# Patient Record
Sex: MALE | Race: BLACK OR AFRICAN AMERICAN
[De-identification: names, ages, dates, MRNs, and addresses within clinical notes are randomized per-mention and may not be internally consistent; named-entity substitution may affect disease eponyms.]

---

## 2018-04-05 ENCOUNTER — HOSPITAL ENCOUNTER (EMERGENCY)
Dept: HOSPITAL 72 - EMR | Age: 78
Discharge: NURSING, REHAB OR LONG-TERM CARE | End: 2018-04-05
Payer: MEDICARE

## 2018-04-05 VITALS — SYSTOLIC BLOOD PRESSURE: 132 MMHG | DIASTOLIC BLOOD PRESSURE: 63 MMHG

## 2018-04-05 VITALS — SYSTOLIC BLOOD PRESSURE: 144 MMHG | DIASTOLIC BLOOD PRESSURE: 99 MMHG

## 2018-04-05 VITALS — DIASTOLIC BLOOD PRESSURE: 99 MMHG | SYSTOLIC BLOOD PRESSURE: 144 MMHG

## 2018-04-05 VITALS — BODY MASS INDEX: 30.48 KG/M2 | WEIGHT: 230 LBS | HEIGHT: 73 IN

## 2018-04-05 VITALS — SYSTOLIC BLOOD PRESSURE: 139 MMHG | DIASTOLIC BLOOD PRESSURE: 60 MMHG

## 2018-04-05 VITALS — DIASTOLIC BLOOD PRESSURE: 69 MMHG | SYSTOLIC BLOOD PRESSURE: 146 MMHG

## 2018-04-05 DIAGNOSIS — I50.9: ICD-10-CM

## 2018-04-05 DIAGNOSIS — R22.1: Primary | ICD-10-CM

## 2018-04-05 DIAGNOSIS — I11.0: ICD-10-CM

## 2018-04-05 DIAGNOSIS — R59.0: ICD-10-CM

## 2018-04-05 DIAGNOSIS — K21.9: ICD-10-CM

## 2018-04-05 DIAGNOSIS — J44.9: ICD-10-CM

## 2018-04-05 LAB
ADD MANUAL DIFF: NO
ALBUMIN SERPL-MCNC: 3.2 G/DL (ref 3.4–5)
ALBUMIN/GLOB SERPL: 0.7 {RATIO} (ref 1–2.7)
ALP SERPL-CCNC: 142 U/L (ref 46–116)
ALT SERPL-CCNC: 94 U/L (ref 12–78)
ANION GAP SERPL CALC-SCNC: 5 MMOL/L (ref 5–15)
AST SERPL-CCNC: 77 U/L (ref 15–37)
BASOPHILS NFR BLD AUTO: 1.6 % (ref 0–2)
BILIRUB SERPL-MCNC: 0.5 MG/DL (ref 0.2–1)
BUN SERPL-MCNC: 16 MG/DL (ref 7–18)
CALCIUM SERPL-MCNC: 8.6 MG/DL (ref 8.5–10.1)
CHLORIDE SERPL-SCNC: 105 MMOL/L (ref 98–107)
CK MB SERPL-MCNC: 0.8 NG/ML (ref 0–3.6)
CK SERPL-CCNC: 93 U/L (ref 26–308)
CO2 SERPL-SCNC: 30 MMOL/L (ref 21–32)
CREAT SERPL-MCNC: 1.2 MG/DL (ref 0.55–1.3)
EOSINOPHIL NFR BLD AUTO: 5.4 % (ref 0–3)
ERYTHROCYTE [DISTWIDTH] IN BLOOD BY AUTOMATED COUNT: 14.9 % (ref 11.6–14.8)
GLOBULIN SER-MCNC: 4.9 G/DL
HCT VFR BLD CALC: 51.3 % (ref 42–52)
HGB BLD-MCNC: 15.8 G/DL (ref 14.2–18)
LYMPHOCYTES NFR BLD AUTO: 25.6 % (ref 20–45)
MCV RBC AUTO: 89 FL (ref 80–99)
MONOCYTES NFR BLD AUTO: 5.7 % (ref 1–10)
NEUTROPHILS NFR BLD AUTO: 61.8 % (ref 45–75)
PLATELET # BLD: 152 K/UL (ref 150–450)
POTASSIUM SERPL-SCNC: 3.7 MMOL/L (ref 3.5–5.1)
RBC # BLD AUTO: 5.75 M/UL (ref 4.7–6.1)
SODIUM SERPL-SCNC: 140 MMOL/L (ref 136–145)
WBC # BLD AUTO: 5.2 K/UL (ref 4.8–10.8)

## 2018-04-05 PROCEDURE — 87040 BLOOD CULTURE FOR BACTERIA: CPT

## 2018-04-05 PROCEDURE — 93005 ELECTROCARDIOGRAM TRACING: CPT

## 2018-04-05 PROCEDURE — 82550 ASSAY OF CK (CPK): CPT

## 2018-04-05 PROCEDURE — 83605 ASSAY OF LACTIC ACID: CPT

## 2018-04-05 PROCEDURE — 82553 CREATINE MB FRACTION: CPT

## 2018-04-05 PROCEDURE — 71045 X-RAY EXAM CHEST 1 VIEW: CPT

## 2018-04-05 PROCEDURE — 70491 CT SOFT TISSUE NECK W/DYE: CPT

## 2018-04-05 PROCEDURE — 80053 COMPREHEN METABOLIC PANEL: CPT

## 2018-04-05 PROCEDURE — 36415 COLL VENOUS BLD VENIPUNCTURE: CPT

## 2018-04-05 PROCEDURE — 99284 EMERGENCY DEPT VISIT MOD MDM: CPT

## 2018-04-05 PROCEDURE — 83690 ASSAY OF LIPASE: CPT

## 2018-04-05 PROCEDURE — 85025 COMPLETE CBC W/AUTO DIFF WBC: CPT

## 2018-04-05 NOTE — EMERGENCY ROOM REPORT
History of Present Illness


General


Chief Complaint:  General Complaint


Source:  Patient, Medical Record





Present Illness


HPI


Patient presents with mass to the right side of the neck


Patient himself reports that this was visualized by staff members today


He denies any fevers or chills denies any chest pain or shortness of breath


He is a dentulous denies any difficulty opening or closing the mouth





The area of the neck is tender to palpation otherwise does not have significant 

discomfort denies any headache denies any posterior neck pain


The area in question is localized to the right lateral aspect


Allergies:  


Coded Allergies:  


     No Known Allergies (Verified , 12/9/06)





Patient History


Past Medical History:  see triage record


Pertinent Family History:  none


Reviewed Nursing Documentation:  PMH: Agreed; PSxH: Agreed





Nursing Documentation-PMH


Past Medical History:  No History, Except For


Hx Cardiac Problems:  Yes - CHF


Hx Hypertension:  Yes


Hx Pacemaker:  No - PVD, CHRONIC AIRWAY OBSTRUCTION,ULCERS


Hx Asthma:  Yes


Hx COPD:  Yes


Hx Cancer:  No


Hx Gastrointestinal Problems:  Yes - gerd


Hx Neurological Problems:  No





Review of Systems


All Other Systems:  negative except mentioned in HPI





Physical Exam





Vital Signs








  Date Time  Temp Pulse Resp B/P (MAP) Pulse Ox O2 Delivery O2 Flow Rate FiO2


 


4/5/18 13:59 97.9 78 18 153/74 94 Room Air  





 97.9       








Sp02 EP Interpretation:  reviewed, normal


General Appearance:  well appearing, no apparent distress


Head:  normocephalic, atraumatic


Eyes:  bilateral eye PERRL, bilateral eye EOMI


ENT:  hearing grossly normal, normal pharynx, TMs + canals normal, uvula midline


Neck:  full range of motion, supple, no meningismus, no bony tend, other - 

Patient has palpable fluctuance to the right SCM region, area also appears to 

involve the lower parotid region and the angle of the mandible, no signs of 

trismus


Respiratory:  lungs clear, normal breath sounds, no rhonchi, no respiratory 

distress, no retraction, no accessory muscle use


Cardiovascular #1:  normal peripheral pulses, regular rate, rhythm, no edema, 

no gallop, no JVD, no murmur


Gastrointestinal:  normal bowel sounds, non tender, soft, no mass, no 

organomegaly, non-distended, no guarding, no hernia, no pulsatile mass, no 

rebound


Genitourinary:  no CVA tenderness


Musculoskeletal:  normal inspection


Neurologic:  oriented x3, responsive, CNs III-XII nml as tested, motor strength/

tone normal, sensory intact


Psychiatric:  mood/affect normal


Skin:  other - The swelling to the right lateral neck, is more noticeable when 

palpated, however there is some mild fullness appreciated as well subjectively


Lymphatic:  other - The area in question does not palpate in a circumscribed 

fashion, does not appear to be a palpable node





Medical Decision Making


Diagnostic Impression:  


 Primary Impression:  


 Neck mass


ER Course


Given the patient's history and presentation extensive blood work was initiated 

patient is complex also requiring CT imaging of the neck





CT imaging does see evidence of 4.4 cm solid mass involving the right 

sternocleidomastoid





Patient's blood work otherwise at baseline levels


In discussion with the patient's primary physician requesting discussion with 

general surgery


I did make contact with consultation the patient will be followed as an 

outpatient basis with further biopsy and testing





Labs








Test


  4/5/18


14:30


 


White Blood Count


  5.2 K/UL


(4.8-10.8)


 


Red Blood Count


  5.75 M/UL


(4.70-6.10)


 


Hemoglobin


  15.8 G/DL


(14.2-18.0)


 


Hematocrit


  51.3 %


(42.0-52.0)


 


Mean Corpuscular Volume 89 FL (80-99) 


 


Mean Corpuscular Hemoglobin


  27.5 PG


(27.0-31.0)


 


Mean Corpuscular Hemoglobin


Concent 30.8 G/DL


(32.0-36.0)


 


Red Cell Distribution Width


  14.9 %


(11.6-14.8)


 


Platelet Count


  152 K/UL


(150-450)


 


Mean Platelet Volume


  9.2 FL


(6.5-10.1)


 


Neutrophils (%) (Auto)


  61.8 %


(45.0-75.0)


 


Lymphocytes (%) (Auto)


  25.6 %


(20.0-45.0)


 


Monocytes (%) (Auto)


  5.7 %


(1.0-10.0)


 


Eosinophils (%) (Auto)


  5.4 %


(0.0-3.0)


 


Basophils (%) (Auto)


  1.6 %


(0.0-2.0)


 


Sodium Level


  140 MMOL/L


(136-145)


 


Potassium Level


  3.7 MMOL/L


(3.5-5.1)


 


Chloride Level


  105 MMOL/L


()


 


Carbon Dioxide Level


  30 MMOL/L


(21-32)


 


Anion Gap


  5 mmol/L


(5-15)


 


Blood Urea Nitrogen


  16 mg/dL


(7-18)


 


Creatinine


  1.2 MG/DL


(0.55-1.30)


 


Estimat Glomerular Filtration


Rate  mL/min (>60) 


 


 


Glucose Level


  104 MG/DL


()


 


Lactic Acid Level


  1.10 mmol/L


(0.66-2.22)


 


Calcium Level


  8.6 MG/DL


(8.5-10.1)


 


Total Bilirubin


  0.5 MG/DL


(0.2-1.0)


 


Aspartate Amino Transf


(AST/SGOT) 77 U/L (15-37) 


 


 


Alanine Aminotransferase


(ALT/SGPT) 94 U/L (12-78) 


 


 


Alkaline Phosphatase


  142 U/L


()


 


Total Creatine Kinase


  93 U/L


()


 


Creatine Kinase MB


  0.8 NG/ML


(0.0-3.6)


 


Creatine Kinase MB Relative


Index 0.8 


 


 


Total Protein


  8.1 G/DL


(6.4-8.2)


 


Albumin


  3.2 G/DL


(3.4-5.0)


 


Globulin 4.9 g/dL 


 


Albumin/Globulin Ratio 0.7 (1.0-2.7) 


 


Lipase


  56 U/L


()








CT/MRI/US Diagnostic Results


CT/MRI/US Diagnostic Results :  


   Impression


CT neckIMPRESSION: 


Right-sided neck mass as detailed above highly concerning for malignancy. Cancer


workup and surgical/ENT referral/evaluation recommended.


 


Mixing artifact versus thrombus in the right internal jugular vein adjacent to 

the


mass.


 


Cervical lymphadenopathy.


 


Aberrant right subclavian artery.


 


Emphysema with hypoventilatory changes versus pneumonitis and/or edema partially


visualized in the lung apices.





Last Vital Signs








  Date Time  Temp Pulse Resp B/P (MAP) Pulse Ox O2 Delivery O2 Flow Rate FiO2


 


4/5/18 13:59 97.9 78 18 153/74 94 Room Air  





 97.9       








Status:  improved


Disposition:  XFER SNF


Condition:  Stable





Additional Instructions:  


Urgent follow-up outpatient by general surgery











Vineet Garces DO Apr 5, 2018 14:21

## 2018-04-05 NOTE — DIAGNOSTIC IMAGING REPORT
Indication: Neck mass

 

Technique: CT of the soft tissues of the neck utilizing automated exposure control

with intravenous contrast. Venous scanning performed. Sagittal and coronal reformats.

 

CT dose: Total .02 mGycm; CTDI vol 19.51 mGy

 

Comparison: None

 

Findings: 

There is a solid mass lesion measuring approximately 4.4 cm located between the right

sternocleidomastoid muscle and the right carotid space at level of the hyoid bone. A

distinct fat plane between the mass and the sternocleidomastoid muscle is not seen

and infiltration/extension to the right sternocleidomastoid muscle is not excluded.

There is effacement of fat planes by the mass between the right carotid space, right

mucosal pharyngeal space and right submandibular space. There is asymmetric

thickening/enlargement of the right right mucosal pharyngeal space/hypopharynx with

effacement of the right piriform sinus.

 

There is obliteration of the lower portions of the right internal jugular vein with

mixing artifact or thrombus in the more superior portions of the right internal

jugular vein (series 5 image #26). Tumor invasion into the internal jugular vein on

the right is not excluded. 

 

 There is right-sided cervical lymphadenopathy, with a right-sided jugular chain

lymph node measuring up to 11 mm in short axis. Small lymph nodes are also noted on

the left. The airway is patent.

 

Thoracic aorta appears normal in caliber with mild atherosclerotic calcification.

There is an aberrant right subclavian artery which is mild to moderately calcified.

Carotid arteries and vertebral arteries appear patent. Imaged intracranial structures

grossly unremarkable. Patient is edentulous. There are multilevel degenerative

changes of the cervical spine. There is pulmonary emphysema with hypoventilatory

changes or pneumonitis in the visualized upper lungs. 

 

IMPRESSION: 

Right-sided neck mass as detailed above highly concerning for malignancy. Cancer

workup and surgical/ENT referral/evaluation recommended.

 

Mixing artifact versus thrombus in the right internal jugular vein adjacent to the

mass.

 

Cervical lymphadenopathy.

 

Aberrant right subclavian artery.

 

Emphysema with hypoventilatory changes versus pneumonitis and/or edema partially

visualized in the lung apices.

 

The CT scanner at [facility] is accredited by the American College of

Radiology and the scans are performed using protocols designed to limit radiation

exposure to as low as reasonably achievable to attain images of sufficient resolution

adequate for diagnostic evaluation.

## 2018-04-05 NOTE — DIAGNOSTIC IMAGING REPORT
Indication: Dyspnea

 

Technique: XRAY Chest 1v

 

Comparison: 8/30/2012

 

Findings: Heart size is within normal limits. Atherosclerotic calcifications noted in

the aortic arch. There is interstitial opacification and patchy bilateral airspace

opacities. Question trace left pleural effusion. No pneumothorax. No acute osseous

abnormality seen.

 

Impression: Interstitial and hazy airspace opacities suggesting a degree of fluid

overload/CHF. Pneumonia is not entirely excluded. Clinical correlation and follow-up

exam recommended.

## 2018-04-24 ENCOUNTER — HOSPITAL ENCOUNTER (OUTPATIENT)
Dept: HOSPITAL 72 - SUR | Age: 78
Discharge: HOME | End: 2018-04-24
Payer: MEDICARE

## 2018-04-24 VITALS — SYSTOLIC BLOOD PRESSURE: 137 MMHG | DIASTOLIC BLOOD PRESSURE: 76 MMHG

## 2018-04-24 VITALS — HEIGHT: 68 IN | WEIGHT: 220 LBS | BODY MASS INDEX: 33.34 KG/M2

## 2018-04-24 VITALS — SYSTOLIC BLOOD PRESSURE: 126 MMHG | DIASTOLIC BLOOD PRESSURE: 70 MMHG

## 2018-04-24 VITALS — SYSTOLIC BLOOD PRESSURE: 126 MMHG | DIASTOLIC BLOOD PRESSURE: 69 MMHG

## 2018-04-24 VITALS — SYSTOLIC BLOOD PRESSURE: 135 MMHG | DIASTOLIC BLOOD PRESSURE: 69 MMHG

## 2018-04-24 VITALS — SYSTOLIC BLOOD PRESSURE: 148 MMHG | DIASTOLIC BLOOD PRESSURE: 68 MMHG

## 2018-04-24 VITALS — SYSTOLIC BLOOD PRESSURE: 143 MMHG | DIASTOLIC BLOOD PRESSURE: 71 MMHG

## 2018-04-24 VITALS — DIASTOLIC BLOOD PRESSURE: 77 MMHG | SYSTOLIC BLOOD PRESSURE: 156 MMHG

## 2018-04-24 VITALS — SYSTOLIC BLOOD PRESSURE: 144 MMHG | DIASTOLIC BLOOD PRESSURE: 71 MMHG

## 2018-04-24 VITALS — SYSTOLIC BLOOD PRESSURE: 141 MMHG | DIASTOLIC BLOOD PRESSURE: 71 MMHG

## 2018-04-24 VITALS — SYSTOLIC BLOOD PRESSURE: 129 MMHG | DIASTOLIC BLOOD PRESSURE: 63 MMHG

## 2018-04-24 DIAGNOSIS — C76.0: Primary | ICD-10-CM

## 2018-04-24 DIAGNOSIS — K21.9: ICD-10-CM

## 2018-04-24 DIAGNOSIS — E66.9: ICD-10-CM

## 2018-04-24 DIAGNOSIS — J44.9: ICD-10-CM

## 2018-04-24 DIAGNOSIS — M19.90: ICD-10-CM

## 2018-04-24 DIAGNOSIS — E11.9: ICD-10-CM

## 2018-04-24 DIAGNOSIS — I11.9: ICD-10-CM

## 2018-04-24 LAB
APTT BLD: 31 SEC (ref 23–33)
INR PPP: 1 (ref 0.9–1.1)

## 2018-04-24 PROCEDURE — 36415 COLL VENOUS BLD VENIPUNCTURE: CPT

## 2018-04-24 PROCEDURE — 21550 BIOPSY OF NECK/CHEST: CPT

## 2018-04-24 PROCEDURE — 85730 THROMBOPLASTIN TIME PARTIAL: CPT

## 2018-04-24 PROCEDURE — 85610 PROTHROMBIN TIME: CPT

## 2018-04-24 PROCEDURE — 94003 VENT MGMT INPAT SUBQ DAY: CPT

## 2018-04-24 PROCEDURE — 94150 VITAL CAPACITY TEST: CPT

## 2018-04-24 NOTE — ANETHESIA PREOPERATIVE EVAL
Anesthesia Pre-op PMH/ROS


General


Date of Evaluation:  Apr 24, 2018


Time of Evaluation:  10:40


Anesthesiologist:  Gail


ASA Score:  ASA 3


Mallampati Score


Class I : Soft palate, uvula, fauces, pillars visible


Class II: Soft palate, uvula, fauces visible


Class III: Soft palate, base of uvula visible


Class IV: Only hard plate visible


Mallampati Classification:  Class III


Surgeon:  Tamir


Diagnosis:  R neck mass


Surgical Procedure:  R neck Bx


Anesthesia History:  none


Family History:  no anesthesia problems


Allergies:  


Coded Allergies:  


     No Known Allergies (Verified , 12/9/06)





Past Medical History


Cardiovascular:  Reports: HTN, CAD; 


   Denies: MI, valve dz, arrhythmia


Pulmonary:  Reports: COPD; 


   Denies: asthma, NATHALY, other


Gastrointestinal/Genitourinary:  Reports: GERD, CRI; 


   Denies: ESRD, other


Neurologic/Psychiatric:  Reports: depression/anxiety; 


   Denies: dementia, CVA, TIA, other


Endocrine:  Reports: DM; 


   Denies: hypothyroidism, steroids, other


HEENT:  Denies: cataract (L), cataract (R), glaucoma, [place] (L), [place] (R), other


Hematology/Immune:  Reports: bleeding disorder - anticoagulated; 


   Denies: anemia, DVT, other


Musculoskeletal/Integumentary:  Reports: DJD; 


   Denies: OA, RA, DDD, edema, other


Other:  obesity


PMH Narrative:


as above


PSxH Narrative:


R foot transmetatarsal amputation





Anesthesia Pre-op Phys. Exam


Physician Exam





Last Vital Signs








  Date Time  Temp Pulse Resp B/P (MAP) Pulse Ox O2 Delivery O2 Flow Rate FiO2


 


4/24/18 08:25 97.7 72 18 141/71 97 Room Air  





 97.7       








Constitutional:  NAD


Neurologic:  CN 2-12 intact


Cardiovascular:  RRR, no M/R/G


Respiratory:  CTA


Gastrointestinal:  other - obesity





Airway Exam


Mallampati Score:  Class III


MO:  limited


Neck:  stiff


Teeth:  missing


Dentures:  no upper, no lower





Anesthesia Pre-op A/P


Labs





Coagulation








Test


  4/24/18


09:00


 


Prothrombin Time


  10.0 SEC


(9.30-11.50)


 


Prothromb Time International


Ratio 1.0 (0.9-1.1)  


 


 


Activated Partial


Thromboplast Time 31 SEC (23-33)


 











Risk Assessment & Plan


Assessment:


ASA 3


Plan:


GA with LMA


Status Change Before Surgery:  No





Pre-Antibiotics


Drug:  Ancef 1 gr.


Given Within 1 Hr of Incision:  Yes


Time Given:  11:06











REILLY MAGANA M.D. Apr 24, 2018 11:06

## 2018-04-24 NOTE — OPERATIVE NOTE - DICTATED
DATE OF OPERATION:  04/24/2018



PREOPERATIVE DIAGNOSIS:  Right neck mass.



POSTOPERATIVE DIAGNOSIS:  Right neck mass.



OPERATION PERFORMED:  Biopsy of right neck mass.



ATTENDING SURGEON:  Christopher Thrasher M.D.



ASSISTANT:  None.



ANESTHESIOLOGIST:  John Reynolds M.D.



ANESTHESIA:  General GTA plus local.



SPECIMENS:  Multiple 18-gauge core biopsies of right neck mass.



COMPLICATIONS:  None.



CONDITION:  Stable.



FLUIDS:  Please see anesthesia records.



ESTIMATED BLOOD LOSS:  Minimal.



DRAINS:  None.



IMPLANTS:  None.



WOUND CLASSIFICATION:  Class 1.



ANTIBIOTICS:  The patient was given 2 g IV Ancef 1 hour prior to cut

time.



COUNTS:  Sponge and needle count correct x2.



INDICATIONS FOR PROCEDURE:  This is an 78-year-old male, nursing home

resident, who few weeks ago was noted to have a worsening tender growing

right neck mass.  The patient was brought to the emergency department for

evaluation and had a CT scan, which identified a large compressing right

neck mass with thrombosed internal jugular, but without any tracheal

deviation.  The patient was deemed stable at that time and discharged and

came to follow up with me.  I was able to see the patient in the nursing

facility that he resides and evaluated and noted a large tender right neck

mass that was more fixed than mobile.  Given these findings of biopsy,

there was a high suspicion for particularly a malignant process given the

patient's long smoking tobacco history.  Given this, biopsy was indicated and

recommended.  Given the location of the mass and it is detailed as above

and noted on the CT, recommendation was made to have a biopsy done in the

operating room under close observation.  Risks, benefits, and alternatives

were discussed with the patient and his daughter at bedside, who consented

to surgery.  The biopsy was indicated and recommended to be done as soon

as possible for tissue diagnosis.



OPERATIVE NOTE:  The patient was taken to the operating room and placed on

the operating table in supine position with bilateral arms out.  All bony

prominences were well padded.  Preoperative time-out was taken in

identifying the patient, operative procedure, operating room surgery,

procedure, and surgical staff.  Side was marked preoperatively.

SCDs were placed.  General anesthesia was induced and the patient was

intubated.  The right neck was prepped and draped in the standard surgical

fashion.  The mass was easily palpable and identified.  A 1 to 2 cm

incision was made overlying the apex of the mass and carried down through

the platysma and muscles to the mass, which was noted to be hard, firm,

and with a whitish discoloration and hypervascular.  Multiple 18-gauge

Jake-Cut core biopsies were taken of the mass and sent to pathology for

review.  Hemostasis was achieved with electrocautery and pressure as

necessary.  Upon completion, local anesthetic was infiltrated throughout

the operative site.  At this time, the wound was closed in a two-layer

fashion beginning with the platysmal layer using a 3-0 Vicryl suture

followed by skin using 4-0 Monocryl subcuticular suture.  The patient

tolerated the procedure well.  Dressings were applied.  The patient was

sent to the PACU in stable condition.









  ______________________________________________

  Christopher Thrasher M.D.





DR:  BROOKS

D:  04/24/2018 15:28

T:  04/24/2018 19:52

JOB#:  [id number]

CC:



MATA

## 2018-04-24 NOTE — PRE-PROCEDURE NOTE/ATTESTATION
Pre-Procedure Note/Attestation


Complete Prior to Procedure


Planned Procedure:  right


Procedure Narrative:


biopsy of right neck mass





Indications for Procedure


Pre-Operative Diagnosis:


right neck mass





Attestation


I attest that I discussed the nature of the procedure; its benefits; risks and 

complications; and alternatives (and the risks and benefits of such alternatives

), prior to the procedure, with the patient (or the patient's legal 

representative).





I attest that, if there was a reasonable possibility of needing a blood 

transfusion, the patient (or the patient's legal representative) was given the 

[facility] standardized written summary, pursuant 

to the [place] Blood Safety Act ([place] Health and Safety Code # 1645, as 

amended).





I attest that I re-evaluated the patient just prior to the surgery and that 

there has been no change in the patient's H&P, except as documented below:











Christopher Thrasher Apr 24, 2018 11:27

## 2018-04-24 NOTE — IMMEDIATE POST-OP EVALUATION
Immediate Post-Op Evalulation


Immediate Post-Op Evalulation


Procedure:  R neck excisional Bx


Date of Evaluation:  Apr 24, 2018


Time of Evaluation:  11:42


IV Fluids:  800


Blood Products:  none


Estimated Blood Loss:  50


Urinary Output:  none


Blood Pressure Systolic:  128


Blood Pressure Diastolic:  70


Pulse Rate:  85


Respiratory Rate:  20


O2 Sat by Pulse Oximetry:  99


Temperature (Fahrenheit):  97.6


Pain Score (1-10):  1


Nausea:  No


Vomiting:  No


Complications


none


Patient Status:  reacts, patent, none


Hydration Status:  adequate











REILLY MAGANA M.D. Apr 24, 2018 11:44

## 2018-04-24 NOTE — BRIEF OPERATIVE NOTE
Immediate Post Operative Note


Operative Note


Pre-op Diagnosis:


right neck mass


Procedure:


biopsy of right neck mass


Post-op Diagnosis:  same as pre-op


Surgeon:  isi


Anesthesiologist:  wes


Anesthesia:  general, local


Specimen:  yes


Complications:  none


Condition:  stable


Fluids:  see records


Estimated Blood Loss:  minimal


Drains:  none


Implant(s) used?:  No











Christopher Thrasher Apr 24, 2018 11:27

## 2018-06-07 ENCOUNTER — HOSPITAL ENCOUNTER (OUTPATIENT)
Dept: HOSPITAL 72 - PAN | Age: 78
Discharge: HOME | End: 2018-06-07
Payer: MEDICARE

## 2018-06-07 DIAGNOSIS — Z91.81: ICD-10-CM

## 2018-06-07 DIAGNOSIS — I10: ICD-10-CM

## 2018-06-07 DIAGNOSIS — R22.1: Primary | ICD-10-CM

## 2018-06-07 DIAGNOSIS — J44.9: ICD-10-CM

## 2018-06-07 DIAGNOSIS — K21.9: ICD-10-CM

## 2018-06-07 DIAGNOSIS — I50.9: ICD-10-CM

## 2018-06-07 PROCEDURE — 99202 OFFICE O/P NEW SF 15 MIN: CPT

## 2018-06-07 NOTE — GI INITIAL CONSULT NOTE
History of Present Illness


General


Date patient seen:  Jun 7, 2018


Time patient seen:  15:00


Referring physician:  ESSIE


Reason for Consultation:  PEG





Present Illness


HPI


HISTORY OF PRESENT ILLNESS:  This is a 78-year-old  male with 

history of peripheral vascular disease and heavy smoker in the 


past who presents for PEG placement.  The patient is a nursing home resident, 

who few months ago was noted to have a worsening tender growing


right neck mass.  The patient was brought to the emergency department for 

evaluation and had a CT scan, which identified a large compressing right


neck mass with thrombosed internal jugular, but without any tracheal deviation.

  The patient is now s/p biopsy of the right neck mass, scheduled for 

chemotherapy, thus referred to us for PEG placement for future anticipation of 

dysphagia. Pt presents today with GI symptoms.  Denies any unintentional weight 

loss or changes in dietary habits.  Patient is a fall risk, uses wheelchair but 

able to ambulate short distances.


Home Meds


Reported Medications


Latanoprost* (XALATAN*) 2.5 Ml Drops, 1 DROP BOTH EYES BEDTIME, ML 0 Refills


   4/24/18


Multivitamin With Minerals (MULTIPLE VITAMIN) 1 Each Tablet, 1 EACH PO, TAB


   10/27/14


Zolpidem Tartrate* (AMBIEN*) 5 Mg Tablet, 5 MG ORAL BEDTIME PRN for Insomnia, 

TAB


   10/27/14


Clopidogrel Bisulfate* (PLAVIX*) 75 Mg Tablet, 75 MG ORAL DAILY, TAB


   9/29/14


Aspirin* (ASPIR 81*) 81 Mg Tablet.dr, 81 MG ORAL DAILY, TAB


   9/29/14


Gabapentin* (NEURONTIN*) 600 Mg Tablet, 600 MG ORAL HS, TAB


   9/29/14


Gabapentin (Neurontin) 300 Mg Cap, 300 MG ORAL THREE TIMES A DAY, #15 CAP 0 

Refills


   9/29/14


Furosemide* (LASIX*) 40 Mg Tablet, 40 MG ORAL BID, TAB


   9/29/14


Hydrocodone/Acetaminophen (Hydrocodon-Acetaminophn ) 1 Ea Tab, 1 TAB ORAL 

Q4H PRN for For Pain, #30 TAB 0 Refills


   9/29/14


Colchicine* (COLCHICINE*) 0.6 Mg Tablet, 0.6 MG PO BID


   2/13/13


Acetaminophen* (TYLENOL*) 650 Mg/20.3 Ml Oral.susp, 650 MG PO PRN Q4H PRN TEMP>

100


   1/17/13


Hydrocodone Bit/Acetaminophen * (NORCO *) 1 Each Tablet, 1 TAB PO 

PRN Q6H, #10 TAB


   PRN PAIN


   1/17/13


Promethazine/Codeine* (PROMETHAZINE/CODEINE*) 5 Ml Udc, 5 ML PO PRN Q8H, #2 OZ


   Take 1 teaspoon by mouth every four hours as needed for cough


   1/17/13


Allopurinol* (ZYLOPRIM*) 300 Mg Tablet, 300 MG PO DAILY, #15 TAB


   1/17/13


Med list reviewed/reconciled:  Yes


Allergies:  


Coded Allergies:  


     No Known Allergies (Verified , 12/9/06)





Patient History


History Provided By:  Patient, Medical Record


[facility] Narrative


Includes history of hypertension, CHF, COPD, and gastroesophageal reflux 

disease.  The patient has a history of  peripheral vascular disease and has had 

vascular surgeries of the lower 


extremities.





Review of Systems


All Other Systems:  negative except mentioned in HPI





Physical Exam


T 97.6


/94


P 73


94 RA





HT 5'8


.3lbs


Sp02 EP Interpretation:  reviewed, normal


General Appearance:  well appearing, no apparent distress, alert


Head:  normocephalic


EENT:  PERRL/EOMI, normal ENT inspection


Neck:  supple


Respiratory:  normal breath sounds, no respiratory distress


Cardiovascular:  normal rate


Gastrointestinal:  normal inspection, non tender, soft, normal bowel sounds, non

-distended


Rectal:  deferred


Genitourinary:  deferred


Musculoskeletal:  normal inspection, back normal


Neurologic:  normal inspection, alert, oriented x3, responsive


Psychiatric:  normal inspection, judgement/insight normal, memory normal


Skin:  normal inspection, normal color, no rash, warm/dry, palpation normal, 

well hydrated


Lymphatic:  normal inspection, no adenopathy





GI: Plan


Problems:  


(1) Encounter for PEG (percutaneous endoscopic gastrostomy)


(2) Encounter for chemotherapy management


(3) Neck mass


Plan


EGD/PEG scheduled for 7/11/18.


- NPO @ [facility] day prior procedure explained.


- orders sent in patient chart





Seen with Dr. Orantes.


Thank you for this patient referral.





The patient was seen and examined at bedside and all new and available data was 

reviewed in the patients chart. I agree with the above findings, impression 

and plan.  (Patient seen earlier today. Signature stamp does not reflect 

patient encounter time.). - MD Mone MattBanner-Ervin NP Jun 7, 2018 17:28

## 2018-06-11 ENCOUNTER — HOSPITAL ENCOUNTER (OUTPATIENT)
Dept: HOSPITAL 72 - GAS | Age: 78
Discharge: HOME | End: 2018-06-11
Payer: MEDICARE

## 2018-06-11 VITALS — SYSTOLIC BLOOD PRESSURE: 168 MMHG | DIASTOLIC BLOOD PRESSURE: 92 MMHG

## 2018-06-11 VITALS — HEIGHT: 67 IN | WEIGHT: 220 LBS | BODY MASS INDEX: 34.53 KG/M2

## 2018-06-11 VITALS — DIASTOLIC BLOOD PRESSURE: 101 MMHG | SYSTOLIC BLOOD PRESSURE: 165 MMHG

## 2018-06-11 VITALS — DIASTOLIC BLOOD PRESSURE: 88 MMHG | SYSTOLIC BLOOD PRESSURE: 151 MMHG

## 2018-06-11 VITALS — DIASTOLIC BLOOD PRESSURE: 85 MMHG | SYSTOLIC BLOOD PRESSURE: 153 MMHG

## 2018-06-11 VITALS — SYSTOLIC BLOOD PRESSURE: 159 MMHG | DIASTOLIC BLOOD PRESSURE: 83 MMHG

## 2018-06-11 VITALS — DIASTOLIC BLOOD PRESSURE: 88 MMHG | SYSTOLIC BLOOD PRESSURE: 150 MMHG

## 2018-06-11 DIAGNOSIS — C14.0: Primary | ICD-10-CM

## 2018-06-11 DIAGNOSIS — R13.10: ICD-10-CM

## 2018-06-11 DIAGNOSIS — I50.9: ICD-10-CM

## 2018-06-11 DIAGNOSIS — J44.9: ICD-10-CM

## 2018-06-11 DIAGNOSIS — I11.0: ICD-10-CM

## 2018-06-11 DIAGNOSIS — K21.9: ICD-10-CM

## 2018-06-11 DIAGNOSIS — G47.00: ICD-10-CM

## 2018-06-11 DIAGNOSIS — Z95.0: ICD-10-CM

## 2018-06-11 DIAGNOSIS — Z79.82: ICD-10-CM

## 2018-06-11 PROCEDURE — 43246 EGD PLACE GASTROSTOMY TUBE: CPT

## 2018-06-11 PROCEDURE — 93005 ELECTROCARDIOGRAM TRACING: CPT

## 2018-06-11 NOTE — SHORT STAY SURGERY H&P
History of Present Illness


History of Present Illness


Chief Complaint


see recent consult note


HPI


Rosas Ricks is a 78 year old male who was admitted on  for GERD





Patient History


Allergies:  


Coded Allergies:  


     No Known Allergies (Verified , 12/9/06)





Medication History


Scheduled


Acetaminophen* (Tylenol*), 650 MG PO PRN Q4H PRN TEMP>100, (Reported)


Allopurinol* (Zyloprim*), 300 MG PO DAILY, (Reported)


Aspirin* (Aspir 81*), 81 MG ORAL DAILY, (Reported)


Clopidogrel Bisulfate* (Plavix*), 75 MG ORAL DAILY, (Reported)


Colchicine* (Colchicine*), 0.6 MG PO BID, (Reported)


Furosemide* (Lasix*), 40 MG ORAL BID, (Reported)


Gabapentin (Neurontin), 300 MG ORAL THREE TIMES A DAY, (Reported)


Gabapentin* (Neurontin*), 600 MG ORAL HS, (Reported)


Hydrocodone Bit/Acetaminophen * (Norco *), 1 TAB PO PRN Q6H, (

Reported)


Latanoprost* (Xalatan*), 1 DROP BOTH EYES BEDTIME, (Reported)


Promethazine/Codeine* (Promethazine/Codeine*), 5 ML PO PRN Q8H, (Reported)





Scheduled PRN


Hydrocodone/Acetaminophen (Hydrocodon-Acetaminophn ), 1 TAB ORAL Q4H PRN 

for For Pain, (Reported)


Zolpidem Tartrate* (Ambien*), 5 MG ORAL BEDTIME PRN for Insomnia, (Reported)





Miscellaneous Medications


Multivitamin With Minerals (Multiple Vitamin), 1 EACH PO, (Reported)





Plan


Attestation


Are the patient's medical conditions optimized for surgery?











Jose Orantes MD Jun 11, 2018 09:48

## 2018-06-11 NOTE — PRE-PROCEDURE NOTE/ATTESTATION
Pre-Procedure Note/Attestation


Complete Prior to Procedure


Planned Procedure:  not applicable


Procedure Narrative:


egd/peg





Indications for Procedure


Pre-Operative Diagnosis:


dysphagia





Attestation


I attest that I discussed the nature of the procedure; its benefits; risks and 

complications; and alternatives (and the risks and benefits of such alternatives

), prior to the procedure, with the patient (or the patient's legal 

representative).





I attest that, if there was a reasonable possibility of needing a blood 

transfusion, the patient (or the patient's legal representative) was given the 

[facility] standardized written summary, pursuant 

to the [place] Blood Safety Act ([place] Health and Safety Code # 1645, as 

amended).





I attest that I re-evaluated the patient just prior to the surgery and that 

there has been no change in the patient's H&P, except as documented below:











Jose Orantes MD Jun 11, 2018 09:48

## 2018-06-11 NOTE — ENDOSCOPY PROCEDURE NOTE
Endoscopy Procedure Note


General


Indication for Procedure:  dysohagia


Procedures Performed:  EGD, PEG


Operative Findings/Diagnosis:  same


Specimen:  none


Pt Tolerated Procedure Well:  Yes


Estimated Blood Loss:  none





Anesthesia


Anesthesiologist:  nikolai


Anesthesia:  MAC





Inserted Devices


Implant(s) used?:  No





GI Core Measures


50 yrs or older w/o bx or poly:  Not Applicable


10yrs. F/U not recommended:  Not Applicable











Jose Orantes MD Jun 11, 2018 15:24

## 2018-06-11 NOTE — 48 HOUR POST ANESTHESIA EVAL
Post Anesthesia Evaluation


Procedure:  egd/peg


Date of Evaluation:  Jun 11, 2018


Time of Evaluation:  14:59


Blood Pressure Systolic:  151


0:  88


Pulse Rate:  78


Respiratory Rate:  18


O2 Sat by Pulse Oximetry:  96


Airway:  patent


Nausea:  No


Vomiting:  No


Pain Intensity:  0


Hydration Status:  adequate


Cardiopulmonary Status:


stable


Mental Status/LOC:  patient returned to baseline


Post-Anesthesia Complications:


none











Nely Olvera MD Jun 11, 2018 19:30

## 2018-06-11 NOTE — IMMEDIATE POST-OP EVALUATION
Immediate Post-Op Evalulation


Immediate Post-Op Evalulation


Procedure:  egd/peg


Date of Evaluation:  Jun 11, 2018


Time of Evaluation:  14:57


IV Fluids:  700ml 0.9ns


Blood Products:  none


Estimated Blood Loss:  negligible


Blood Pressure Systolic:  165


Blood Pressure Diastolic:  101


Pulse Rate:  81


Respiratory Rate:  18


O2 Sat by Pulse Oximetry:  95


Temperature (Fahrenheit):  98.1


Pain Score (1-10):  0


Nausea:  No


Vomiting:  No


Complications


none


Patient Status:  awake, reacts, patent


Hydration Status:  adequate


Drug:  cefoxitin 1 gram


Time Given:  14:10











Nely Olvera MD Jun 11, 2018 19:28

## 2018-06-11 NOTE — PROCEDURE NOTE
DATE OF PROCEDURE:  06/11/2018



SURGEON:  Jose Orantes M.D.



REFERRING PHYSICIAN:  Puja Joseph M.D.



PROCEDURE:  Upper endoscopy with PEG placement.



ANESTHESIA:  Per Dr. Olvera.



INSTRUMENT:  Olympus adult upper scope.



INDICATION:  Throat cancer, pending chemoradiation and needed G-tube

placement for possible future dysphagia



The procedure, risks, benefits, and possible consequences, including

hemorrhage, aspiration, perforation and infection, and alternative

treatments, were explained to the patient/legal guardian by Dr. Jose Orantes and the patient/legal guardian understood and accepted these

risks.



DESCRIPTION OF PROCEDURE:  After informed consent was obtained and the

patient was sedated, Olympus upper endoscope was advanced from mouth into

the second portion of duodenum and retroflexion was performed in the

stomach.



The patient has evidence of mass sitting in the throat area, making this

procedure little bit challenging.  There was little bit of bleeding from

the scope passing through the mouth into the esophagus.



In the stomach, there was no diffuse gastritis.



Then, under endoscopic guidance and under sterile condition, a 20-Emirati

pull type of G-tube was successfully placed in the epigastric area.  The

distance from the tip of the tube to skin was about less than 3 cm in

size.  The patient tolerated the procedure well without any

complication.



SUMMARY OF FINDINGS:

1. Throat cancer.

2. Status post successful G-tube placement.



RECOMMENDATION:

1.  Abdominal binder.

2. Elevate head of the bed at all times.

3. G-tube flush.  G-tube care.  Start tube feeding later today.  The

patient received dose of antibiotics prior to this procedure.



I want to thank Dr. Joseph for this kind referral.









  ______________________________________________

  Jose Orantes M.D.





DR:  ALEKS

D:  06/11/2018 15:23

T:  06/11/2018 16:30

JOB#:  [id number]

CC:  Puja Joseph M.D.; Fax#:  [phone]

## 2018-06-12 NOTE — CARDIOLOGY REPORT
--------------- APPROVED REPORT --------------





EKG Measurement

Heart [id number]

AL [id number]

[id number]

[id number]

[id number]





Normal sinus rhythm

Normal ECG